# Patient Record
Sex: MALE | Race: NATIVE HAWAIIAN OR OTHER PACIFIC ISLANDER | NOT HISPANIC OR LATINO | Employment: FULL TIME | URBAN - METROPOLITAN AREA
[De-identification: names, ages, dates, MRNs, and addresses within clinical notes are randomized per-mention and may not be internally consistent; named-entity substitution may affect disease eponyms.]

---

## 2019-01-01 ENCOUNTER — APPOINTMENT (OUTPATIENT)
Dept: RADIOLOGY | Facility: MEDICAL CENTER | Age: 19
End: 2019-01-01
Attending: EMERGENCY MEDICINE

## 2019-01-01 ENCOUNTER — HOSPITAL ENCOUNTER (EMERGENCY)
Facility: MEDICAL CENTER | Age: 19
End: 2019-01-01
Attending: EMERGENCY MEDICINE

## 2019-01-01 VITALS
RESPIRATION RATE: 15 BRPM | BODY MASS INDEX: 29.76 KG/M2 | DIASTOLIC BLOOD PRESSURE: 82 MMHG | SYSTOLIC BLOOD PRESSURE: 130 MMHG | HEIGHT: 66 IN | HEART RATE: 94 BPM | WEIGHT: 185.19 LBS | TEMPERATURE: 97.4 F | OXYGEN SATURATION: 98 %

## 2019-01-01 DIAGNOSIS — T07.XXXA MULTIPLE CONTUSIONS: ICD-10-CM

## 2019-01-01 DIAGNOSIS — F10.10 ALCOHOL ABUSE: ICD-10-CM

## 2019-01-01 PROCEDURE — 73130 X-RAY EXAM OF HAND: CPT | Mod: RT

## 2019-01-01 PROCEDURE — 99285 EMERGENCY DEPT VISIT HI MDM: CPT

## 2019-01-01 PROCEDURE — 73030 X-RAY EXAM OF SHOULDER: CPT | Mod: LT

## 2019-01-01 NOTE — ED TRIAGE NOTES
"Roland Flores  18 y.o. male  Chief Complaint   Patient presents with   • GLF     Pt was at a gas station sitting in a chair when employee's report he slumped over and fell to the ground. Upon EMS arrival pt was responsive to painful stimuli. At this time he is axo x4 GCS 15, reporting his only medical complaint is pain to right fingers. No obvious deformity   • Alcohol Intoxication     \"More than ten shots of tequila.\"          Pt is alert and oriented, speaking in full sentences, follows commands and responds appropriately to questions. Resp are even and unlabored. No behavioral indicators of pain.   Pt's mother at BS.    "

## 2019-01-01 NOTE — ED NOTES
Report from Loly DUTTON pt care assumed. Pt is more awake he is able to answer questions appropriately pt given PO fluids.

## 2019-01-01 NOTE — CONSULTS
"[facility]   TRIAGE ASSESSMENT    Name: Roland Flores  MRN: [id number]  : 2000  Age: 18 y.o.  Date of assessment: 2019  PCP: No primary care provider on file.  Persons in attendance: Patient    CHIEF COMPLAINT/PRESENTING ISSUE (as stated by patient):  Patient is a 18 yr old male that moved to [place] at the age of 12 yrs.  States that he has always felt like he didn't fit in.  Patient states that 1 year ago he climbed up in a tree and put a rope around his neck and was going to jump but then thought about his family and little brother.  Patient denies any SI since then but has struggles with feeling of stress.  States that he does well with family but seems that there is a lot of \"drama and verbal \"fighting\".  Patient drinking daily and states that he was just drinking related to New Years Brenda.  Patient states that he tries \"to be good and make my family proud of me.\"  Patient works full time at [employer] for the past 1 and 3 months.  Has future plans to go to College and become an .  States that his family does not believe in mental health and \"Mom just tells me to pray.\"     Chief Complaint   Patient presents with   • GLF     Pt was at a gas station sitting in a chair when employee's report he slumped over and fell to the ground. Upon EMS arrival pt was responsive to painful stimuli. At this time he is axo x4 GCS 15, reporting his only medical complaint is pain to right fingers. No obvious deformity   • Alcohol Intoxication     \"More than ten shots of tequila.\"         CURRENT LIVING SITUATION/SOCIAL SUPPORT: Lives with Mother, Father and little brother    BEHAVIORAL HEALTH TREATMENT HISTORY  Does patient/parent report a history of prior behavioral health treatment for patient?   No:    SAFETY ASSESSMENT - SELF  Does patient acknowledge current or past symptoms of dangerousness to self? yes  Does parent/significant other report patient has current or past symptoms of dangerousness to self? " N\A  Does presenting problem suggest symptoms of dangerousness to self? No    SAFETY ASSESSMENT - OTHERS  Does patient acknowledge current or past symptoms of aggressive behavior or risk to others? no  Does parent/significant other report patient has current or past symptoms of aggressive behavior or risk to others?  N\A  Does presenting problem suggest symptoms of dangerousness to others? No    Crisis Safety Plan completed and copy given to patient? no    ABUSE/NEGLECT SCREENING  Does patient report feeling “unsafe” in his/her home, or afraid of anyone?  no  Does patient report any history of physical, sexual, or emotional abuse?  no  Does parent or significant other report any of the above? N\A  Is there evidence of neglect by self?  no  Is there evidence of neglect by a caregiver? no  Does the patient/parent report any history of CPS/APS/police involvement related to suspected abuse/neglect or domestic violence? no  Based on the information provided during the current assessment, is a mandated report of suspected abuse/neglect being made?  No    SUBSTANCE USE SCREENING  Yes:  Tyler all substances used in the past 30 days:      Last Use Amount   [x]   Alcohol yesterday       UDS results: not done   Breathalyzer results: not completed     MENTAL STATUS   Participation: Active verbal participation  Grooming: Casual and Neat  Orientation: Alert and Fully Oriented  Behavior: Tense  Eye contact: Limited  Mood: Anxious  Affect: Congruent with content  Thought process: Goal-directed  Thought content: Within normal limits  Speech: Rate within normal limits and Soft  Perception: Within normal limits  Memory:  No gross evidence of memory deficits  Insight: Adequate  Judgment:  Adequate  Other:    Collateral information:   Source:  [] Significant other present in person:   [] Significant other by telephone  [] [facility]   [x] [facility] Nursing Staff  [x] [facility]  [] Other:     CLINICAL  IMPRESSIONS:  Primary:  Adjustment DO   Secondary:  Mood Do with Anxiety      IDENTIFIED NEEDS/PLAN:  [Trigger DISPOSITION list for any items marked]    []  Imminent safety risk - self [] Imminent safety risk - others   []  Acute substance withdrawal []  Psychosis/Impaired reality testing   [x]  Mood/anxiety []  Substance use/Addictive behavior   []  Maladaptive behaviro []  Parent/child conflict   []  Family/Couples conflict []  Biomedical   []  Housing []  Financial   []   Legal  Occupational/Educational   []  Domestic violence []  Other:     Disposition: Refer to [facility]: Outpt, [facility] at his [facility].    Does patient express agreement with the above plan? Yes,  All information given to patient and with pt permission to Mother.  She is very supportive at this time of him getting into get counseling.      Referral appointment(s) scheduled? Mother states that they will go there on Friday    Alert team only:   I have discussed findings and recommendations with Dr. Jara who is in agreement with these recommendations.     Sumi Garcia R.N.  1/1/2019

## 2019-01-01 NOTE — ED NOTES
Pt rounded on, asleep in bed, respirations even and unlabored, repositioning self as needed, will continue to monitor.

## 2019-01-01 NOTE — ED NOTES
[facility] life skills RN at , pt falling asleep through conversation and does not converse. VSS. ERP updated. Life skills will re-evaluate when pt is sober. Will continue to monitor pt. Family back to room.

## 2019-01-01 NOTE — ED NOTES
Pt rounded on, resting in bed, respirations even and unlabored, repositioning self as needed, pt's mother in restroom, and pt appears tearful. Upon further discussion with pt, he reports he is experiencing a lot of stressors in his life and while he does not feel SI or HI he does feel that he needs someone to talk to. ERP notified. Life skills called.

## 2019-01-01 NOTE — ED NOTES
LS at  she is providing resources for pt to both pt and parent per consent from pt to provide this info.

## 2019-01-01 NOTE — ED NOTES
IV DC'd with cath intact, site bandaged, bleeding controlled. Pt verbalized understanding of DC instructions, pt with no further questions. Pt ambulate out of ED with steady gait.

## 2019-01-01 NOTE — ED PROVIDER NOTES
"ED Provider Note    CHIEF COMPLAINT  Chief Complaint   Patient presents with   • GLF     Pt was at a gas station sitting in a chair when employee's report he slumped over and fell to the ground. Upon EMS arrival pt was responsive to painful stimuli. At this time he is axo x4 GCS 15, reporting his only medical complaint is pain to right fingers. No obvious deformity   • Alcohol Intoxication     \"More than ten shots of tequila.\"        HPI  Roland Flores is a 18 y.o. male who presents with right hand pain.  Patient was drinking alcohol heavily last night.  Reports he had at least 10 shots of tequila while out.  At some point during the night he did punch the ground because he was angry.  He denies punching an individual, but he does have pain over his right hand.  He was walking home.  He stopped at a gas station.  He sat down in a chair and slumped forward falling out of the chair.  EMS was called.  Patient woke up to painful stimulus and was transported here.  He denies headache, chest pain, shortness of breath, abdominal pain.  No fever.  Denies any illicit drugs.    REVIEW OF SYSTEMS  Pertinent negative: As above, all other systems reviewed and negative    PAST MEDICAL HISTORY  History reviewed. No pertinent past medical history.    SOCIAL HISTORY  Social History   Substance Use Topics   • Smoking status: Light Tobacco Smoker   • Smokeless tobacco: Never Used   • Alcohol use Yes      Comment: \"special occasions\"       SURGICAL HISTORY  History reviewed. No pertinent surgical history.    ALLERGIES  No Known Allergies    PHYSICAL EXAM  VITAL SIGNS: /85   Pulse (!) 101   Temp 36.3 °C (97.4 °F) (Temporal)   Resp 16   Ht 1.676 m (5' 6\")   Wt 84 kg (185 lb 3 oz)   SpO2 98%   BMI 29.89 kg/m²    Constitutional: Awake and alert. Nontoxic.  Slow deliberate speech consistent with alcohol use  HENT:  Grossly normal  Eyes: Injected bulbar conjunctiva with symmetric reactive pupils  Neck: Normal range of " motion  Cardiovascular: Normal heart rate at 95 on the cardiac monitor with a normal rhythm  Thorax & Lungs: No respiratory distress  Abdomen: Nontender  Skin:  No pathologic rash.   Extremities: Tenderness contusion and swelling over the right hand abrasion over the third and fourth PIP.  No fight bite.  Deformity/edema over the fourth/fifth MCP.  Mild tenderness diffusely over the left shoulder.  Other extremities are without trauma.    Psychiatric: Affect normal    RADIOLOGY/PROCEDURES  DX-SHOULDER 2+ LEFT   Final Result      Negative LEFT shoulder series.      DX-HAND 3+ RIGHT   Final Result      No acute fracture identified. If pain persists, recommend repeat imaging in 7 days.         Imaging is interpreted by radiologist      COURSE & MEDICAL DECISION MAKING  Patient presents after falling out of a chair while intoxicated.  No evidence of head injury.  He did have complaints of hand pain initially.  He sobered and later complained of left shoulder pain.  X-rays were obtained of the hand and of the shoulder.  These were negative.    Patient reported feeling depressed lately.  He was allowed to sober in the ER.  He was seen by lifeskills.  He has not suicidal.  He is not felt to be a danger to himself.  He was given resources.    Patient will be discharged with his family.  He is to be brought back to the ER for any concerning medical symptoms or notices other area of injury.  He should follow through with psychiatry referrals.  He should be rechecked by primary provider if he has persistent hand or shoulder pain.        FINAL IMPRESSION  1.  Altered mental status secondary to alcohol intoxication  2.  Right hand contusion  3.  Left shoulder contusion/sprain      Disposition: home in good condition      This dictation was created using voice recognition software. The accuracy of the dictation is limited to the abilities of the software.  The nursing notes were reviewed and certain aspects of this information  were incorporated into this note.      Electronically signed by: Enio Jara, 1/1/2019 4:18 AM

## 2020-04-15 ENCOUNTER — NON-PROVIDER VISIT (OUTPATIENT)
Dept: URGENT CARE | Facility: PHYSICIAN GROUP | Age: 20
End: 2020-04-15

## 2020-04-15 DIAGNOSIS — Z02.1 PRE-EMPLOYMENT DRUG SCREENING: ICD-10-CM

## 2020-04-15 LAB
AMP AMPHETAMINE: NORMAL
COC COCAINE: NORMAL
INT CON NEG: NEGATIVE
INT CON POS: POSITIVE
MET METHAMPHETAMINES: NORMAL
OPI OPIATES: NORMAL
PCP PHENCYCLIDINE: NORMAL
POC DRUG COMMENT 753798-OCCUPATIONAL HEALTH: NEGATIVE
THC: NORMAL

## 2020-04-15 PROCEDURE — 80305 DRUG TEST PRSMV DIR OPT OBS: CPT | Performed by: NURSE PRACTITIONER
